# Patient Record
Sex: MALE | Race: WHITE | NOT HISPANIC OR LATINO | Employment: OTHER | URBAN - METROPOLITAN AREA
[De-identification: names, ages, dates, MRNs, and addresses within clinical notes are randomized per-mention and may not be internally consistent; named-entity substitution may affect disease eponyms.]

---

## 2023-12-24 ENCOUNTER — HOSPITAL ENCOUNTER (EMERGENCY)
Facility: HOSPITAL | Age: 68
Discharge: LEFT BEFORE DISCHARGE | End: 2023-12-25
Attending: EMERGENCY MEDICINE
Payer: MEDICARE

## 2023-12-24 DIAGNOSIS — T18.9XXA FOREIGN BODY ALIMENTARY TRACT, INITIAL ENCOUNTER: ICD-10-CM

## 2023-12-24 DIAGNOSIS — K22.2 BENIGN ESOPHAGEAL STRICTURE: ICD-10-CM

## 2023-12-24 DIAGNOSIS — T18.128A ESOPHAGEAL OBSTRUCTION DUE TO FOOD IMPACTION: Primary | ICD-10-CM
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
## 2023-12-24 DIAGNOSIS — K44.9 HIATAL HERNIA: ICD-10-CM

## 2023-12-24 PROCEDURE — 63600175 PHARM REV CODE 636 W HCPCS: Mod: JZ,JG,ER | Performed by: EMERGENCY MEDICINE

## 2023-12-24 PROCEDURE — 96372 THER/PROPH/DIAG INJ SC/IM: CPT | Performed by: EMERGENCY MEDICINE

## 2023-12-24 PROCEDURE — 99285 EMERGENCY DEPT VISIT HI MDM: CPT

## 2023-12-24 RX ORDER — GLUCAGON 1 MG
1 KIT INJECTION
Status: COMPLETED | OUTPATIENT
Start: 2023-12-24 | End: 2023-12-24

## 2023-12-24 RX ORDER — GLUCAGON 1 MG
1 KIT INJECTION
Status: DISCONTINUED | OUTPATIENT
Start: 2023-12-24 | End: 2023-12-24

## 2023-12-24 RX ADMIN — GLUCAGON 1 MG: 1 INJECTION, POWDER, LYOPHILIZED, FOR SOLUTION INTRAMUSCULAR; INTRAVENOUS at 09:12

## 2023-12-25 ENCOUNTER — ANESTHESIA EVENT (OUTPATIENT)
Dept: ENDOSCOPY | Facility: HOSPITAL | Age: 68
End: 2023-12-25
Payer: MEDICARE

## 2023-12-25 ENCOUNTER — ANESTHESIA (OUTPATIENT)
Dept: ENDOSCOPY | Facility: HOSPITAL | Age: 68
End: 2023-12-25
Payer: MEDICARE

## 2023-12-25 VITALS
HEART RATE: 78 BPM | WEIGHT: 252.13 LBS | TEMPERATURE: 98 F | DIASTOLIC BLOOD PRESSURE: 67 MMHG | HEIGHT: 77 IN | SYSTOLIC BLOOD PRESSURE: 149 MMHG | OXYGEN SATURATION: 95 % | RESPIRATION RATE: 20 BRPM | BODY MASS INDEX: 29.77 KG/M2

## 2023-12-25 PROBLEM — K44.9 HIATAL HERNIA: Status: ACTIVE | Noted: 2023-12-25

## 2023-12-25 PROBLEM — T18.9XXA FOREIGN BODY ALIMENTARY TRACT, INITIAL ENCOUNTER: Status: ACTIVE | Noted: 2023-12-25

## 2023-12-25 PROBLEM — K22.2 BENIGN ESOPHAGEAL STRICTURE: Status: ACTIVE | Noted: 2023-12-25

## 2023-12-25 PROCEDURE — 88305 TISSUE EXAM BY PATHOLOGIST: CPT | Performed by: STUDENT IN AN ORGANIZED HEALTH CARE EDUCATION/TRAINING PROGRAM

## 2023-12-25 PROCEDURE — 43239 EGD BIOPSY SINGLE/MULTIPLE: CPT | Mod: 51,,, | Performed by: INTERNAL MEDICINE

## 2023-12-25 PROCEDURE — 25000003 PHARM REV CODE 250: Performed by: NURSE ANESTHETIST, CERTIFIED REGISTERED

## 2023-12-25 PROCEDURE — 27201089 HC SNARE, DISP (ANY): Performed by: INTERNAL MEDICINE

## 2023-12-25 PROCEDURE — 37000009 HC ANESTHESIA EA ADD 15 MINS: Performed by: INTERNAL MEDICINE

## 2023-12-25 PROCEDURE — 43247 EGD REMOVE FOREIGN BODY: CPT | Performed by: INTERNAL MEDICINE

## 2023-12-25 PROCEDURE — 63600175 PHARM REV CODE 636 W HCPCS: Performed by: NURSE ANESTHETIST, CERTIFIED REGISTERED

## 2023-12-25 PROCEDURE — 43239 EGD BIOPSY SINGLE/MULTIPLE: CPT | Performed by: INTERNAL MEDICINE

## 2023-12-25 PROCEDURE — 88342 IMHCHEM/IMCYTCHM 1ST ANTB: CPT | Performed by: STUDENT IN AN ORGANIZED HEALTH CARE EDUCATION/TRAINING PROGRAM

## 2023-12-25 PROCEDURE — 27201012 HC FORCEPS, HOT/COLD, DISP: Performed by: INTERNAL MEDICINE

## 2023-12-25 PROCEDURE — 88305 TISSUE EXAM BY PATHOLOGIST: CPT | Mod: 26,,, | Performed by: STUDENT IN AN ORGANIZED HEALTH CARE EDUCATION/TRAINING PROGRAM

## 2023-12-25 PROCEDURE — 43247 EGD REMOVE FOREIGN BODY: CPT | Mod: ,,, | Performed by: INTERNAL MEDICINE

## 2023-12-25 PROCEDURE — 88342 IMHCHEM/IMCYTCHM 1ST ANTB: CPT | Mod: 26,,, | Performed by: STUDENT IN AN ORGANIZED HEALTH CARE EDUCATION/TRAINING PROGRAM

## 2023-12-25 PROCEDURE — 99284 EMERGENCY DEPT VISIT MOD MDM: CPT | Mod: 25,,, | Performed by: INTERNAL MEDICINE

## 2023-12-25 PROCEDURE — 37000008 HC ANESTHESIA 1ST 15 MINUTES: Performed by: INTERNAL MEDICINE

## 2023-12-25 RX ORDER — LIDOCAINE HYDROCHLORIDE 10 MG/ML
INJECTION, SOLUTION EPIDURAL; INFILTRATION; INTRACAUDAL; PERINEURAL
Status: DISCONTINUED | OUTPATIENT
Start: 2023-12-25 | End: 2023-12-25

## 2023-12-25 RX ORDER — PANTOPRAZOLE SODIUM 40 MG/1
40 TABLET, DELAYED RELEASE ORAL 2 TIMES DAILY
Qty: 180 TABLET | Refills: 3 | Status: SHIPPED | OUTPATIENT
Start: 2023-12-25 | End: 2024-12-24

## 2023-12-25 RX ORDER — PROPOFOL 10 MG/ML
VIAL (ML) INTRAVENOUS
Status: DISCONTINUED | OUTPATIENT
Start: 2023-12-25 | End: 2023-12-25

## 2023-12-25 RX ADMIN — SODIUM CHLORIDE, SODIUM LACTATE, POTASSIUM CHLORIDE, AND CALCIUM CHLORIDE: .6; .31; .03; .02 INJECTION, SOLUTION INTRAVENOUS at 12:12

## 2023-12-25 RX ADMIN — PROPOFOL 50 MG: 10 INJECTION, EMULSION INTRAVENOUS at 12:12

## 2023-12-25 RX ADMIN — LIDOCAINE HYDROCHLORIDE 50 MG: 10 SOLUTION INTRAVENOUS at 12:12

## 2023-12-25 RX ADMIN — PROPOFOL 150 MG: 10 INJECTION, EMULSION INTRAVENOUS at 12:12

## 2023-12-25 NOTE — H&P (VIEW-ONLY)
[facility] ([facility])  Gastroenterology  Consult Note    Patient Name: José Miguel Fleming Jr.  MRN: [id number]  Admission Date: 12/24/2023  Hospital Length of Stay: 0 days  Code Status: No Order   Attending Provider: No att. providers found   Consulting Provider: Brissa Emerson MD  Primary Care Physician: No primary care provider on file.  Principal Problem:Foreign body alimentary tract, initial encounter    Inpatient consult to Gastroenterology  Consult performed by: Brissa Emerson MD  Consult ordered by: Joey Lucas Jr., MD  Reason for consult: food impaction        Subjective:     HPI:  68 y.o male with history of frequent GERD who takes Rolaids and TUMS daily presented to [facility] ED this evening after taking a bite of steak this afternoon and feeling that it had become lodged. He attempted to drink fluids and self induced vomiting but was unsuccessful. Admits to similar symptoms in the past and has been able to dislodge food in the past. Denies previous EGD in the past. GERD for decades. No blood thinners.     Previous colonoscopy in 1992 for anemia. States he even received a blood transfusion prior to the colonoscopy because of the anemia. Found to have hemorrhoids. Denies polyps. No Fhc of CRC. Fhx of maternal GM with pancreatic CA, diagnosed in her 80's. Has not had a colonoscopy since that time.     No past medical history on file.    No past surgical history on file.    Review of patient's allergies indicates:  No Known Allergies  Family History    None       Tobacco Use    Smoking status: Not on file    Smokeless tobacco: Not on file   Substance and Sexual Activity    Alcohol use: Not on file    Drug use: Not on file    Sexual activity: Not on file     Review of Systems   Gastrointestinal:  Positive for vomiting.   All other systems reviewed and are negative.    Objective:     Vital Signs (Most Recent):  Temp: 98.7 °F (37.1 °C) (12/24/23 2050)  Pulse: 92 (12/24/23 2348)  Resp: 18  "(12/24/23 2348)  BP: (!) 149/70 (12/24/23 2348)  SpO2: 95 % (12/24/23 2348) Vital Signs (24h Range):  Temp:  [98.7 °F (37.1 °C)] 98.7 °F (37.1 °C)  Pulse:  [78-92] 92  Resp:  [15-18] 18  SpO2:  [95 %-99 %] 95 %  BP: (138-163)/(67-86) 149/70     Weight: 114.4 kg (252 lb 1.5 oz) (12/24/23 2048)  Body mass index is 29.89 kg/m².    No intake or output data in the 24 hours ending 12/25/23 0015    Lines/Drains/Airways       None                    Physical Exam  Vitals and nursing note reviewed.   Constitutional:       Appearance: He is overweight.   Cardiovascular:      Rate and Rhythm: Normal rate and regular rhythm.      Heart sounds: Normal heart sounds.   Pulmonary:      Effort: Pulmonary effort is normal.      Breath sounds: Normal breath sounds.   Abdominal:      General: Abdomen is protuberant. Bowel sounds are normal.      Palpations: Abdomen is soft.      Tenderness: There is no abdominal tenderness.   Neurological:      General: No focal deficit present.      Mental Status: He is alert and oriented to person, place, and time. Mental status is at baseline.   Psychiatric:         Attention and Perception: Attention normal.         Mood and Affect: Mood normal.         Speech: Speech normal.         Behavior: Behavior normal. Behavior is cooperative.         Thought Content: Thought content normal.         Cognition and Memory: Cognition normal.         Judgment: Judgment normal.          Significant Labs:  CBC: No results for input(s): "WBC", "HGB", "HCT", "PLT" in the last 48 hours.  CMP: No results for input(s): "GLU", "CALCIUM", "ALBUMIN", "PROT", "NA", "K", "CO2", "CL", "BUN", "CREATININE", "ALKPHOS", "ALT", "AST", "BILITOT" in the last 48 hours.  Coagulation: No results for input(s): "PT", "INR", "APTT" in the last 48 hours.    Significant Imaging:  Imaging results within the past 24 hours have been reviewed.  Assessment/Plan:     * Foreign body alimentary tract, initial encounter  Steak at 5pm.   Previous " episodes in past but able to relieve obstruction in past  No previous EGD      Recommendations:  EGD now    Thank you for your consult. I will follow-up with patient. Please contact us if you have any additional questions.    Brissa Emerson MD  Gastroenterology  [facility] ([facility])

## 2023-12-25 NOTE — ASSESSMENT & PLAN NOTE
Steak at 5pm.   Previous episodes in past but able to relieve obstruction in past  No previous EGD

## 2023-12-25 NOTE — HPI
68 y.o male with history of frequent GERD who takes Rolaids and TUMS daily presented to [facility] ED this evening after taking a bite of steak this afternoon and feeling that it had become lodged. He attempted to drink fluids and self induced vomiting but was unsuccessful. Admits to similar symptoms in the past and has been able to dislodge food in the past. Denies previous EGD in the past. GERD for decades. No blood thinners.     Previous colonoscopy in 1992 for anemia. States he even received a blood transfusion prior to the colonoscopy because of the anemia. Found to have hemorrhoids. Denies polyps. No Fhc of CRC. Fhx of maternal GM with pancreatic CA, diagnosed in her 80's. Has not had a colonoscopy since that time.

## 2023-12-25 NOTE — SUBJECTIVE & OBJECTIVE
No past medical history on file.    No past surgical history on file.    Review of patient's allergies indicates:  No Known Allergies  Family History    None       Tobacco Use    Smoking status: Not on file    Smokeless tobacco: Not on file   Substance and Sexual Activity    Alcohol use: Not on file    Drug use: Not on file    Sexual activity: Not on file     Review of Systems   Gastrointestinal:  Positive for vomiting.   All other systems reviewed and are negative.    Objective:     Vital Signs (Most Recent):  Temp: 98.7 °F (37.1 °C) (12/24/23 2050)  Pulse: 92 (12/24/23 2348)  Resp: 18 (12/24/23 2348)  BP: (!) 149/70 (12/24/23 2348)  SpO2: 95 % (12/24/23 2348) Vital Signs (24h Range):  Temp:  [98.7 °F (37.1 °C)] 98.7 °F (37.1 °C)  Pulse:  [78-92] 92  Resp:  [15-18] 18  SpO2:  [95 %-99 %] 95 %  BP: (138-163)/(67-86) 149/70     Weight: 114.4 kg (252 lb 1.5 oz) (12/24/23 2048)  Body mass index is 29.89 kg/m².    No intake or output data in the 24 hours ending 12/25/23 0015    Lines/Drains/Airways       None                    Physical Exam  Vitals and nursing note reviewed.   Constitutional:       Appearance: He is overweight.   Cardiovascular:      Rate and Rhythm: Normal rate and regular rhythm.      Heart sounds: Normal heart sounds.   Pulmonary:      Effort: Pulmonary effort is normal.      Breath sounds: Normal breath sounds.   Abdominal:      General: Abdomen is protuberant. Bowel sounds are normal.      Palpations: Abdomen is soft.      Tenderness: There is no abdominal tenderness.   Neurological:      General: No focal deficit present.      Mental Status: He is alert and oriented to person, place, and time. Mental status is at baseline.   Psychiatric:         Attention and Perception: Attention normal.         Mood and Affect: Mood normal.         Speech: Speech normal.         Behavior: Behavior normal. Behavior is cooperative.         Thought Content: Thought content normal.         Cognition and Memory:  "Cognition normal.         Judgment: Judgment normal.          Significant Labs:  CBC: No results for input(s): "WBC", "HGB", "HCT", "PLT" in the last 48 hours.  CMP: No results for input(s): "GLU", "CALCIUM", "ALBUMIN", "PROT", "NA", "K", "CO2", "CL", "BUN", "CREATININE", "ALKPHOS", "ALT", "AST", "BILITOT" in the last 48 hours.  Coagulation: No results for input(s): "PT", "INR", "APTT" in the last 48 hours.    Significant Imaging:  Imaging results within the past 24 hours have been reviewed.  "

## 2023-12-25 NOTE — ED PROVIDER NOTES
Encounter Date: 12/24/2023       History     Chief Complaint   Patient presents with    Foreign Body In Throat     Steak stuck in throat since 5pm     HPI  Pt reports difficulty swallowing liquids and solids after eating steak and a piece lodged in throat. Pt reports hx of similar events but usually resolve after several minutes. Pt unable top tolerate liquid intake since 5pm.     Review of patient's allergies indicates:  No Known Allergies  No past medical history on file.  No past surgical history on file.  No family history on file.     Review of Systems   Constitutional:  Negative for fever.   HENT:  Negative for sore throat.    Respiratory:  Negative for shortness of breath.    Cardiovascular:  Negative for chest pain.   Gastrointestinal:  Negative for abdominal pain and nausea.   Genitourinary:  Negative for dysuria.   Musculoskeletal:  Negative for back pain.   Skin:  Negative for rash.   Neurological:  Negative for weakness.   Hematological:  Does not bruise/bleed easily.       Physical Exam     Initial Vitals   BP Pulse Resp Temp SpO2   12/24/23 2050 12/24/23 2050 12/24/23 2049 12/24/23 2050 12/24/23 2050   138/67 81 18 98.7 °F (37.1 °C) 96 %      MAP       --                Physical Exam    Nursing note and vitals reviewed.  Constitutional: He appears well-developed and well-nourished. No distress.   HENT:   Head: Normocephalic and atraumatic.   Mouth/Throat: Oropharynx is clear and moist.   Eyes: Conjunctivae and EOM are normal. Pupils are equal, round, and reactive to light.   Neck: Neck supple.   Normal range of motion.  Cardiovascular:  Normal rate, regular rhythm and normal heart sounds.           Pulmonary/Chest: Breath sounds normal.   Abdominal: Abdomen is soft. Bowel sounds are normal.   Musculoskeletal:         General: Normal range of motion.      Cervical back: Normal range of motion and neck supple.     Neurological: He is alert and oriented to person, place, and time. He has normal strength.    Skin: Skin is warm and dry.   Psychiatric: He has a normal mood and affect. Thought content normal.       Pt vomited oral challenge, unable to tolerate po intake.    ED Course   Procedures  Labs Reviewed - No data to display       Imaging Results    None     GI Consult- discussed case with Dr Emerson, agrees c plan. Send to ED [facility] for endoscopy    9:33 PM Discussed lab/imaging studies with patient and the need for further evaluation/admission for EG FB. Pt verbalized understanding that this is a stand alone ER and we are unable to admit at this facility. Pt will be transferred to [facility] ED via POV. Offered [facility] but patient refuses and will have his wifr tranport him to [facility]. Discussed risks involved including aspiration, hypotension. Pt signing out AMA for transport only. I discussed this case with HS and care was accepted by Dr Neri/Shayy.       Medications   glucagon (human recombinant) injection 1 mg (1 mg Intramuscular Given 12/24/23 2110)     Medical Decision Making  Problems Addressed:  Esophageal obstruction due to food impaction: acute illness or injury that poses a threat to life or bodily functions    Risk  Prescription drug management.  Decision regarding hospitalization.                                      Clinical Impression:  Final diagnoses:  [T18.128A, W44.F3XA] Esophageal obstruction due to food impaction (Primary)          ED Disposition Condition    ED to ED Transfer Stable                Glen Andrade MD  12/24/23 2139

## 2023-12-25 NOTE — CONSULTS
[facility] ([facility])  Gastroenterology  Consult Note    Patient Name: José Miguel Fleming Jr.  MRN: [id number]  Admission Date: 12/24/2023  Hospital Length of Stay: 0 days  Code Status: No Order   Attending Provider: No att. providers found   Consulting Provider: Brissa Emerson MD  Primary Care Physician: No primary care provider on file.  Principal Problem:Foreign body alimentary tract, initial encounter    Inpatient consult to Gastroenterology  Consult performed by: Brissa Emerson MD  Consult ordered by: Joey Lucas Jr., MD  Reason for consult: food impaction        Subjective:     HPI:  68 y.o male with history of frequent GERD who takes Rolaids and TUMS daily presented to [facility] ED this evening after taking a bite of steak this afternoon and feeling that it had become lodged. He attempted to drink fluids and self induced vomiting but was unsuccessful. Admits to similar symptoms in the past and has been able to dislodge food in the past. Denies previous EGD in the past. GERD for decades. No blood thinners.     Previous colonoscopy in 1992 for anemia. States he even received a blood transfusion prior to the colonoscopy because of the anemia. Found to have hemorrhoids. Denies polyps. No Fhc of CRC. Fhx of maternal GM with pancreatic CA, diagnosed in her 80's. Has not had a colonoscopy since that time.     No past medical history on file.    No past surgical history on file.    Review of patient's allergies indicates:  No Known Allergies  Family History    None       Tobacco Use    Smoking status: Not on file    Smokeless tobacco: Not on file   Substance and Sexual Activity    Alcohol use: Not on file    Drug use: Not on file    Sexual activity: Not on file     Review of Systems   Gastrointestinal:  Positive for vomiting.   All other systems reviewed and are negative.    Objective:     Vital Signs (Most Recent):  Temp: 98.7 °F (37.1 °C) (12/24/23 2050)  Pulse: 92 (12/24/23 2348)  Resp: 18  "(12/24/23 2348)  BP: (!) 149/70 (12/24/23 2348)  SpO2: 95 % (12/24/23 2348) Vital Signs (24h Range):  Temp:  [98.7 °F (37.1 °C)] 98.7 °F (37.1 °C)  Pulse:  [78-92] 92  Resp:  [15-18] 18  SpO2:  [95 %-99 %] 95 %  BP: (138-163)/(67-86) 149/70     Weight: 114.4 kg (252 lb 1.5 oz) (12/24/23 2048)  Body mass index is 29.89 kg/m².    No intake or output data in the 24 hours ending 12/25/23 0015    Lines/Drains/Airways       None                    Physical Exam  Vitals and nursing note reviewed.   Constitutional:       Appearance: He is overweight.   Cardiovascular:      Rate and Rhythm: Normal rate and regular rhythm.      Heart sounds: Normal heart sounds.   Pulmonary:      Effort: Pulmonary effort is normal.      Breath sounds: Normal breath sounds.   Abdominal:      General: Abdomen is protuberant. Bowel sounds are normal.      Palpations: Abdomen is soft.      Tenderness: There is no abdominal tenderness.   Neurological:      General: No focal deficit present.      Mental Status: He is alert and oriented to person, place, and time. Mental status is at baseline.   Psychiatric:         Attention and Perception: Attention normal.         Mood and Affect: Mood normal.         Speech: Speech normal.         Behavior: Behavior normal. Behavior is cooperative.         Thought Content: Thought content normal.         Cognition and Memory: Cognition normal.         Judgment: Judgment normal.          Significant Labs:  CBC: No results for input(s): "WBC", "HGB", "HCT", "PLT" in the last 48 hours.  CMP: No results for input(s): "GLU", "CALCIUM", "ALBUMIN", "PROT", "NA", "K", "CO2", "CL", "BUN", "CREATININE", "ALKPHOS", "ALT", "AST", "BILITOT" in the last 48 hours.  Coagulation: No results for input(s): "PT", "INR", "APTT" in the last 48 hours.    Significant Imaging:  Imaging results within the past 24 hours have been reviewed.  Assessment/Plan:     * Foreign body alimentary tract, initial encounter  Steak at 5pm.   Previous " episodes in past but able to relieve obstruction in past  No previous EGD      Recommendations:  EGD now    Thank you for your consult. I will follow-up with patient. Please contact us if you have any additional questions.    Brissa Emerson MD  Gastroenterology  [facility] ([facility])

## 2023-12-25 NOTE — PROVATION PATIENT INSTRUCTIONS
Discharge Summary/Instructions after an Endoscopic Procedure  Patient Name: José Miguel Fleming  Patient MRN: [id number]  Patient YOB: 1955 Monday, December 25, 2023 Brissa Emerson MD  Dear patient,  As a result of recent federal legislation (The Federal Cures Act), you may   receive lab or pathology results from your procedure in your [facility]   account before your physician is able to contact you. Your physician or   their representative will relay the results to you with their   recommendations at their soonest availability.  Thank you,  RESTRICTIONS:  During your procedure today, you received medications for sedation.  These   medications may affect your judgment, balance and coordination.  Therefore,   for 24 hours, you have the following restrictions:   - DO NOT drive a car, operate machinery, make legal/financial decisions,   sign important papers or drink alcohol.    ACTIVITY:  Today: no heavy lifting, straining or running due to procedural   sedation/anesthesia.  The following day: return to full activity including work.  DIET:  Eat and drink normally unless instructed otherwise.     TREATMENT FOR COMMON SIDE EFFECTS:  - Mild abdominal pain, nausea, belching, bloating or excessive gas:  rest,   eat lightly and use a heating pad.  - Sore Throat: treat with throat lozenges and/or gargle with warm salt   water.  - Because air was used during the procedure, expelling large amounts of air   from your rectum or belching is normal.  - If a bowel prep was taken, you may not have a bowel movement for 1-3 days.    This is normal.  SYMPTOMS TO WATCH FOR AND REPORT TO YOUR PHYSICIAN:  1. Abdominal pain or bloating, other than gas cramps.  2. Chest pain.  3. Back pain.  4. Signs of infection such as: chills or fever occurring within 24 hours   after the procedure.  5. Rectal bleeding, which would show as bright red, maroon, or black stools.   (A tablespoon of blood from the rectum is not serious, especially if    hemorrhoids are present.)  6. Vomiting.  7. Weakness or dizziness.  GO DIRECTLY TO THE NEAREST EMERGENCY ROOM IF YOU HAVE ANY OF THE FOLLOWING:      Difficulty breathing              Chills and/or fever over 101 F   Persistent vomiting and/or vomiting blood   Severe abdominal pain   Severe chest pain   Black, tarry stools   Bleeding- more than one tablespoon   Any other symptom or condition that you feel may need urgent attention  Your doctor recommends these additional instructions:  If any biopsies were taken, your doctors clinic will contact you in 1 to 2   weeks with any results.  - Discharge patient to home (with escort).   - Clear liquid diet today.   - Use Protonix (pantoprazole) 40 mg PO BID.   - Await pathology results.   - Repeat upper endoscopy in 4 weeks for retreatment.   - The findings and recommendations were discussed with the patient's family.     - Return to GI clinic. You will be contacted to schedule this study by our   GI department.  For questions, problems or results please call your physician Brissa Emerson MD at Work:  [phone]  If you have any questions about the above instructions, call the GI   department at [phone] or call the endoscopy unit at [phone]   from 7am until 3 pm.  [facility], EMERGENCY ROOM PHONE NUMBER:   [phone]  IF A COMPLICATION OR EMERGENCY SITUATION ARISES AND YOU ARE UNABLE TO REACH   YOUR PHYSICIAN - GO DIRECTLY TO THE EMERGENCY ROOM.  I have read or have had read to me these discharge instructions for my   procedure and have received a written copy.  I understand these   instructions and will follow-up with my physician if I have any questions.     __________________________________       _____________________________________  Nurse Signature                                          Patient/Designated   Responsible Party Signature  MD Brissa Sanders MD  12/25/2023 12:47:50 AM  This report has been  verified and signed electronically.  Dear patient,  As a result of recent federal legislation (The Federal Cures Act), you may   receive lab or pathology results from your procedure in your [facility]   account before your physician is able to contact you. Your physician or   their representative will relay the results to you with their   recommendations at their soonest availability.  Thank you,  PROVATION

## 2023-12-25 NOTE — ANESTHESIA PREPROCEDURE EVALUATION
12/25/2023  José Miguel Fleming Jr. is a 68 y.o., male.    There is no problem list on file for this patient.     Pre-op Assessment    I have reviewed the Patient Summary Reports.     I have reviewed the Nursing Notes. I have reviewed the NPO Status.   I have reviewed the Medications.     Review of Systems  Anesthesia Hx:             Denies Family Hx of Anesthesia complications.    Denies Personal Hx of Anesthesia complications.                    Social:  Former Smoker       Hematology/Oncology:  Hematology Normal   Oncology Normal                                   EENT/Dental:  EENT/Dental Normal           Cardiovascular:  Cardiovascular Normal                                            Pulmonary:  Pulmonary Normal                       Renal/:  Renal/ Normal                 Hepatic/GI:        Food bolus  Last meal at 1500          Musculoskeletal:  Musculoskeletal Normal                Neurological:  Neurology Normal                                      Endocrine:  Endocrine Normal            Dermatological:  Skin Normal    Psych:  Psychiatric Normal                  Physical Exam  General: Alert    Airway:  Mallampati: II   Mouth Opening: Normal  TM Distance: Normal  Tongue: Normal  Neck ROM: Normal ROM    Anesthesia Plan  Type of Anesthesia, risks & benefits discussed:    Anesthesia Type: Gen ETT, MAC  Intra-op Monitoring Plan: Standard ASA Monitors  Induction:  IV  Informed Consent: Informed consent signed with the Patient and all parties understand the risks and agree with anesthesia plan.  All questions answered.   ASA Score: 2 Emergent  Day of Surgery Review of History & Physical: I have interviewed and examined the patient. I have reviewed the patient's H&P dated:     Ready For Surgery From Anesthesia Perspective.   .

## 2023-12-25 NOTE — ED NOTES
Bed: [facility] 01  Expected date:   Expected time:   Means of arrival:   Comments:

## 2023-12-25 NOTE — PLAN OF CARE
Procedure completed. Wife at bedside to speak with Dr Emerson. Reviewed discharge instructions and post procedure precautions. New Rx sent to preferred pharmacy. Education on new med complete.

## 2023-12-25 NOTE — ANESTHESIA POSTPROCEDURE EVALUATION
Anesthesia Post Evaluation    Patient: José Miguel Fleming Jr.    Procedure(s) Performed: Procedure(s) (LRB):  EGD, WITH FOREIGN BODY REMOVAL (N/A)    Final Anesthesia Type: MAC      Patient location during evaluation: PACU  Patient participation: Yes- Able to Participate  Level of consciousness: awake and alert  Post-procedure vital signs: reviewed and stable  Pain management: adequate  Airway patency: patent    PONV status at discharge: No PONV  Anesthetic complications: no      Cardiovascular status: blood pressure returned to baseline  Respiratory status: spontaneous ventilation  Hydration status: euvolemic  Follow-up not needed.              Vitals Value Taken Time   /64 12/25/23 0040   Temp 36.8 °C (98.2 °F) 12/25/23 0040   Pulse 86 12/25/23 0040   Resp 18 12/25/23 0040   SpO2 95 % 12/25/23 0040         No case tracking events are documented in the log.      Pain/Ruben Score: Ruben Score: 10 (12/25/2023 12:41 AM)

## 2023-12-25 NOTE — TRANSFER OF CARE
"Anesthesia Transfer of Care Note    Patient: José Miguel Fleming Jr.    Procedure(s) Performed: Procedure(s) (LRB):  EGD, WITH FOREIGN BODY REMOVAL (N/A)    Patient location: PACU    Anesthesia Type: MAC    Post pain: adequate analgesia    Post assessment: no apparent anesthetic complications and tolerated procedure well    Post vital signs: stable    Level of consciousness: sedated    Nausea/Vomiting: no nausea/vomiting    Complications: none    Transfer of care protocol was followed      Last vitals: Visit Vitals  BP (!) 142/64   Pulse 86   Temp 36.8 °C (98.2 °F)   Resp 18   Ht 6' 5" (1.956 m)   Wt 114.4 kg (252 lb 1.5 oz)   SpO2 95%   BMI 29.89 kg/m²     "

## 2023-12-25 NOTE — ED PROVIDER NOTES
SCRIBE #1 NOTE: I, Hawk Gunn, am scribing for, and in the presence of, Joey Lucas Jr., MD. I have scribed the entire note.       History     Chief Complaint   Patient presents with    Foreign Body In Throat     Steak stuck in throat since 5pm     Review of patient's allergies indicates:  No Known Allergies      History of Present Illness     HPI    12/24/2023, 10:47 PM  History obtained from the patient      History of Present Illness: José Miguel Fleming Jr. is a 68 y.o. male patient who presents to the Emergency Department after getting steak stuck in his throat since 5 PM. Pt was seen by Dr. Andrade and was transferred to this facility for an endoscopy. At this time, pt is still unable to tolerate PO. Symptoms are constant and moderate in severity. No mitigating or exacerbating factors reported. Patient denies all other sxs at this time. No further complaints or concerns at this time.       Arrival mode: Personal vehicle     PCP: No primary care provider on file.        Past Medical History:  History reviewed. No pertinent past medical history.    Past Surgical History:  History reviewed. No pertinent surgical history.      Family History:  History reviewed. No pertinent family history.    Social History:  Social History     Tobacco Use    Smoking status: Unknown    Smokeless tobacco: Not on file   Substance and Sexual Activity    Alcohol use: Not on file    Drug use: Not on file    Sexual activity: Not on file        Review of Systems     Review of Systems   Constitutional:  Negative for fever.   HENT:  Positive for trouble swallowing. Negative for sore throat.         (+) foreign body in throat   Respiratory:  Negative for shortness of breath.    Cardiovascular:  Negative for chest pain.   Gastrointestinal:  Negative for nausea.   Genitourinary:  Negative for dysuria.   Musculoskeletal:  Negative for back pain.   Skin:  Negative for rash.   Neurological:  Negative for weakness.   All other systems  "reviewed and are negative.     Physical Exam     Initial Vitals   BP Pulse Resp Temp SpO2   12/24/23 2050 12/24/23 2050 12/24/23 2049 12/24/23 2050 12/24/23 2050   138/67 81 18 98.7 °F (37.1 °C) 96 %      MAP       --                 Physical Exam  Nursing Notes and Vital Signs Reviewed.  Constitutional: Patient is in no acute distress. Well-developed and well-nourished.  Head: Atraumatic. Normocephalic.  Eyes:  EOM intact.  No scleral icterus.  ENT: Mucous membranes are moist.  Nares clear   Neck:  Full ROM. No JVD.  Cardiovascular: Regular rate. Regular rhythm No murmurs, rubs, or gallops. Distal pulses are 2+ and symmetric  Pulmonary/Chest: No respiratory distress. Clear to auscultation bilaterally. No wheezing or rales.  Equal chest wall rise bilaterally  Abdominal: Soft and non-distended.  There is no tenderness.  No rebound, guarding, or rigidity. Good bowel sounds.  Genitourinary: No CVA tenderness.  No suprapubic tenderness  Musculoskeletal: Moves all extremities. No obvious deformities.  5 x 5 strength in all extremities   Skin: Warm and dry.  Neurological:  Alert, awake, and appropriate.  Normal speech.  No acute focal neurological deficits are appreciated.  Two through 12 intact bilaterally.  Psychiatric: Normal affect. Good eye contact. Appropriate in content.       ED Course   Procedures  ED Vital Signs:  Vitals:    12/24/23 2048 12/24/23 2049 12/24/23 2050 12/24/23 2152   BP:   138/67    Pulse:   81 78   Resp:  18  18   Temp:   98.7 °F (37.1 °C)    TempSrc:  Oral Oral    SpO2:   96% 97%   Weight: 114.4 kg (252 lb 1.5 oz)      Height: 6' 5" (1.956 m)       12/24/23 2252 12/24/23 2348 12/25/23 0017   BP: (!) 163/86 (!) 149/70 (!) 153/63   Pulse: 84 92 83   Resp: 15 18 20   Temp:      TempSrc:      SpO2: 99% 95% 99%   Weight:      Height:          Abnormal Lab Results:  Labs Reviewed - No data to display     All Lab Results:  No results found for this or any previous visit.      Imaging Results:  Imaging " Results    None                 The Emergency Provider reviewed the vital signs and test results, which are outlined above.     ED Discussion     12:00 AM: Discussed case with Dr. Emerson (GI). Dr. Emerson agrees with current care and management of pt and accepts admission.   Admitting Service: Hospital Medicine  Admitting Physician: Dr. Emerson  Admit to: GI lab    12:01 AM: Re-evaluated pt. I have discussed test results, shared treatment plan, and the need for admission with patient and family at bedside. Pt and family express understanding at this time and agree with all information. All questions answered. Pt and family have no further questions or concerns at this time. Pt is ready for admit.         Medical Decision Making  Differential diagnosis: Foreign body sensation in throat, globus hystericus    Patient referred from our [facility] for esophageal foreign body.  GI is aware.  On arrival patient was handling secretions but notes continued foreign body sensation.  Gi is aware and will bring the patient to the GI lab for endoscopy.  Patient was aware.  He is NPO no airway compromise on exam.  No stridor.  Handling secretions well    Amount and/or Complexity of Data Reviewed  External Data Reviewed: notes.  Discussion of management or test interpretation with external provider(s): Discussed the case with GI doctor Ki.  Will bring the patient to the lab for treatment    Risk  Prescription drug management.  Decision regarding hospitalization.                ED Medication(s):  Medications   glucagon (human recombinant) injection 1 mg (1 mg Intramuscular Given 12/24/23 2110)       There are no discharge medications for this patient.              Scribe Attestation:   Scribe #1: I performed the above scribed service and the documentation accurately describes the services I performed. I attest to the accuracy of the note.     Attending:   Physician Attestation Statement for Scribe #1: Shayy PORTER Rodney W.  MD Eliseo, personally performed the services described in this documentation, as scribed by Hawk Gunn, in my presence, and it is both accurate and complete.          Clinical Impression       ICD-10-CM ICD-9-CM   1. Esophageal obstruction due to food impaction  T18.128A 530.3    [id number].F3XA 935.1       Disposition:   Disposition: Placed in Observation  Condition: Joey Sigala Jr., MD  12/25/23 0036

## 2023-12-25 NOTE — INTERVAL H&P NOTE
The patient has been examined and the H&P has been reviewed:    I concur with the findings and no changes have occurred since H&P was written.    Anesthesia/Surgery risks, benefits and alternative options discussed and understood by patient/family.    The risks, benefits and alternatives of the procedure were discussed with the patient in detail. This discussion was had in the presence of endoscopy staff. The risks include, risks of adverse reaction to sedation requiring the use of reversal agents, bleeding requiring blood transfusion, perforation requiring surgical intervention and technical failure. Other risks include aspiration leading to respiratory distress and respiratory failure resulting in endotracheal intubation and mechanical ventilation including death. If anesthesia is being utilized for this procedure, it is up to the anesthesiologist to determine airway safety including elective endotracheal intubation. Questions were answered, they agree to proceed. There was no language barriers.        Active Hospital Problems    Diagnosis  POA    *Foreign body alimentary tract, initial encounter [T18.9XXA]  Yes      Resolved Hospital Problems   No resolved problems to display.

## 2024-01-09 LAB
FINAL PATHOLOGIC DIAGNOSIS: NORMAL
GROSS: NORMAL
Lab: NORMAL
SUPPLEMENTAL DIAGNOSIS: NORMAL

## 2024-01-13 NOTE — PROGRESS NOTES
AN staff: please call patient with results since he does not have patient portal. Inform the following:  The biopsies of your stomach show signs of inflammation but no infection. Please continue to take Protonix twice a day as recommended. I recommend a repeat endoscopy in 2 weeks. You will be hearing from our schedulers to schedule the endoscopy.     ADA: please contact patient and schedule EGD with dilation.